# Patient Record
Sex: FEMALE | Race: WHITE | URBAN - METROPOLITAN AREA
[De-identification: names, ages, dates, MRNs, and addresses within clinical notes are randomized per-mention and may not be internally consistent; named-entity substitution may affect disease eponyms.]

---

## 2020-08-20 ENCOUNTER — NEW PATIENT (OUTPATIENT)
Dept: URBAN - METROPOLITAN AREA CLINIC 30 | Facility: CLINIC | Age: 50
End: 2020-08-20
Payer: COMMERCIAL

## 2020-08-20 DIAGNOSIS — H04.123 TEAR FILM INSUFFICIENCY OF BILATERAL LACRIMAL GLANDS: ICD-10-CM

## 2020-08-20 DIAGNOSIS — H11.442 CONJUNCTIVAL CYSTS, LEFT EYE: Primary | ICD-10-CM

## 2020-08-20 PROCEDURE — 99203 OFFICE O/P NEW LOW 30 MIN: CPT | Performed by: OPTOMETRIST

## 2021-12-20 ENCOUNTER — OFFICE VISIT (OUTPATIENT)
Dept: URBAN - METROPOLITAN AREA CLINIC 30 | Facility: CLINIC | Age: 51
End: 2021-12-20
Payer: COMMERCIAL

## 2021-12-20 DIAGNOSIS — E11.9 TYPE 2 DIABETES MELLITUS W/O COMPLICATION: ICD-10-CM

## 2021-12-20 DIAGNOSIS — Z79.84 LONG TERM USE OF ORAL ANTIDIABETIC DRUG: ICD-10-CM

## 2021-12-20 PROCEDURE — 92134 CPTRZ OPH DX IMG PST SGM RTA: CPT | Performed by: OPTOMETRIST

## 2021-12-20 PROCEDURE — 92014 COMPRE OPH EXAM EST PT 1/>: CPT | Performed by: OPTOMETRIST

## 2021-12-20 ASSESSMENT — KERATOMETRY
OS: 45.40
OD: 45.42

## 2021-12-20 ASSESSMENT — VISUAL ACUITY
OS: 20/30
OD: 20/25

## 2021-12-20 ASSESSMENT — INTRAOCULAR PRESSURE
OD: 15
OS: 15

## 2021-12-20 NOTE — IMPRESSION/PLAN
Impression: Tear film insufficiency of bilateral lacrimal glands Plan: Pt using ATs prn, when she thinks about it or her eyes are bothering her. Rec regular use BID-QID OU. Discussed blink breaks during computer use. Consider Rx gtt if NI. 

*Pt is interested in LASIK. Discussed lens replacement vs refractive sx.

## 2021-12-20 NOTE — IMPRESSION/PLAN
Impression: Type 2 diabetes mellitus w/o complication: L58.9. Plan: No apparent retinopathy on exam today- undilated. Patient educated on importance of BG control. Monitor annually. Discussed importance of annual dilated exam. MAC OCT WNL OU.